# Patient Record
Sex: FEMALE | Race: WHITE | NOT HISPANIC OR LATINO | URBAN - METROPOLITAN AREA
[De-identification: names, ages, dates, MRNs, and addresses within clinical notes are randomized per-mention and may not be internally consistent; named-entity substitution may affect disease eponyms.]

---

## 2022-01-17 ENCOUNTER — OFFICE VISIT (OUTPATIENT)
Dept: FAMILY MEDICINE CLINIC | Facility: CLINIC | Age: 22
End: 2022-01-17
Payer: COMMERCIAL

## 2022-01-17 VITALS
WEIGHT: 134 LBS | TEMPERATURE: 99.3 F | HEIGHT: 70 IN | DIASTOLIC BLOOD PRESSURE: 76 MMHG | BODY MASS INDEX: 19.18 KG/M2 | HEART RATE: 112 BPM | SYSTOLIC BLOOD PRESSURE: 118 MMHG | OXYGEN SATURATION: 99 %

## 2022-01-17 DIAGNOSIS — N92.6 IRREGULAR MENSES: ICD-10-CM

## 2022-01-17 DIAGNOSIS — F41.9 ANXIETY: ICD-10-CM

## 2022-01-17 DIAGNOSIS — J30.9 ALLERGIC RHINITIS, UNSPECIFIED SEASONALITY, UNSPECIFIED TRIGGER: ICD-10-CM

## 2022-01-17 DIAGNOSIS — Z00.00 ANNUAL PHYSICAL EXAM: Primary | ICD-10-CM

## 2022-01-17 PROCEDURE — 3008F BODY MASS INDEX DOCD: CPT | Performed by: FAMILY MEDICINE

## 2022-01-17 PROCEDURE — 99214 OFFICE O/P EST MOD 30 MIN: CPT | Performed by: FAMILY MEDICINE

## 2022-01-17 PROCEDURE — 1036F TOBACCO NON-USER: CPT | Performed by: FAMILY MEDICINE

## 2022-01-17 PROCEDURE — 99385 PREV VISIT NEW AGE 18-39: CPT | Performed by: FAMILY MEDICINE

## 2022-01-17 PROCEDURE — 3725F SCREEN DEPRESSION PERFORMED: CPT | Performed by: FAMILY MEDICINE

## 2022-01-17 RX ORDER — CETIRIZINE HYDROCHLORIDE 10 MG/1
10 TABLET ORAL DAILY
COMMUNITY

## 2022-01-17 RX ORDER — NORGESTIMATE AND ETHINYL ESTRADIOL 7DAYSX3 LO
1 KIT ORAL DAILY
Qty: 28 TABLET | Refills: 5 | Status: SHIPPED | OUTPATIENT
Start: 2022-01-17 | End: 2022-03-15 | Stop reason: SDUPTHER

## 2022-01-17 NOTE — PROGRESS NOTES
i  [facility] PRIMARY CARE    NAME: Shara Lancaster  AGE: 25 y o  SEX: female  : 2000     DATE: 2022     Assessment and Plan:     Problem List Items Addressed This Visit        Respiratory    Allergic rhinitis       Other    Anxiety , premenstrual  She had thyroid labs done in December while at school at [employer]  Normal    We discussed starting an SSRI for her anxiety  We also discussed starting oral contraceptives to help regulate cycles  She prefers to try the oral contraceptive  Has my chart and will reach out to me if anxiety worsens while at school  Otherwise, will f/u in March over her spring break  Other Visit Diagnoses     Annual physical exam    -  Primary  Had covid vaccine --primary series and booster  Will obtain records (including vaccination records) from previous PCP/pediatrician      Irregular menses        Relevant Medications    norgestimate-ethinyl estradiol (Ortho Tri-Cyclen Lo) 0 18/0 215/0 25 MG-25 MCG per tablet    rx for ortho tricyclen lo   start explained  Immunizations and preventive care screenings were discussed with patient today  Appropriate education was printed on patient's after visit summary  Counseling:  Alcohol/drug use: discussed moderation in alcohol intake, the recommendations for healthy alcohol use, and avoidance of illicit drug use  Dental Health: discussed importance of regular tooth brushing, flossing, and dental visits  Sexual health: discussed sexually transmitted diseases, partner selection, use of condoms, avoidance of unintended pregnancy, and contraceptive alternatives  · Exercise: the importance of regular exercise/physical activity was discussed  Recommend exercise 3-5 times per week for at least 30 minutes  No follow-ups on file       Chief Complaint:     Chief Complaint   Patient presents with   [facility] Patient Visit establish care    Menstrual Problem     irregular, alot of anxiety when coming up      History of Present Illness:     Adult Annual Physical   Patient here for a comprehensive physical exam  The patient reports problems - irregular menses and anxiety in days leading up to menses  Menarche age 17  Were every 40 days and lasted around 7 days  Since September, becoming closer together  No missed cycles  Last few cycles have been every 28 days  Last 3 days  No heavy bleeding or cramping  No change in exercise routine or in her eating habits  She walks for exercise at least 3 days per week  Feels panicked for the week prior to menses  Will have obsessive thoughts  Makes it difficult to sleep  Once she gets menses, the anxiety resolves  She never feels sad or depressed  States that she will not really feel anxious other times of the month  Previous PCP was Kvng Panchal  No records for review at time of this office visit  Diet and Physical Activity  · Diet/Nutrition: consuming 3-5 servings of fruits/vegetables daily  · Exercise: walking  Depression Screening  PHQ-2/9 Depression Screening    Little interest or pleasure in doing things: 0 - not at all  Feeling down, depressed, or hopeless: 0 - not at all  PHQ-2 Score: 0  PHQ-2 Interpretation: Negative depression screen       General Health  · Sleep: some issues with falling asleep      · Hearing: normal - bilateral   · Vision: goes for regular eye exams  Wears corrective lenses  · Dental: regular dental visits  /GYN Health  · Last menstrual period: 1/10/22  · Contraceptive method: none  · History of STDs?: no        Review of Systems:     Review of Systems   Past Medical History:     Past Medical History:   Diagnosis Date    Allergic     Anxiety     Anxieties gets significantly more noticeable in week before    Asthma     Had asthma as a child, grew out of it around age 17      Past Surgical History:     History reviewed  No pertinent surgical history  Social History:     Social History     Socioeconomic History    Marital status: Single     Spouse name: None    Number of children: None    Years of education: None    Highest education level: None   Occupational History    None   Tobacco Use    Smoking status: Never Smoker    Smokeless tobacco: Never Used   Vaping Use    Vaping Use: Never used   Substance and Sexual Activity    Alcohol use: Yes     Comment: occasional    Drug use: Never    Sexual activity: Never   Other Topics Concern    None   Social History Narrative    Lives with parents    She is a student at [employer] and [facility] [employer]  Social Determinants of Health     Financial Resource Strain: Not on file   Food Insecurity: Not on file   Transportation Needs: Not on file   Physical Activity: Not on file   Stress: Not on file   Social Connections: Not on file   Intimate Partner Violence: Not on file   Housing Stability: Not on file      Family History:     Family History   Problem Relation Age of Onset    Hypothyroidism Mother     Diabetes type I Brother       Current Medications:     Current Outpatient Medications   Medication Sig Dispense Refill    cetirizine (ZyrTEC Allergy) 10 mg tablet Take 10 mg by mouth daily      Fluticasone Propionate (FLONASE ALLERGY RELIEF NA) into each nostril daily      norgestimate-ethinyl estradiol (Ortho Tri-Cyclen Lo) 0 18/0 215/0 25 MG-25 MCG per tablet Take 1 tablet by mouth daily 28 tablet 5     No current facility-administered medications for this visit  Allergies:      Allergies   Allergen Reactions    Pollen Extract Nasal Congestion and Sneezing      Physical Exam:     /76 (BP Location: Left arm, Patient Position: Sitting, Cuff Size: Adult)   Pulse (!) 112   Temp 99 3 °F (37 4 °C) (Tympanic)   Ht 5' 10" (1 778 m)   Wt 60 8 kg (134 lb)   LMP 01/10/2022   SpO2 99%   BMI 19 23 kg/m²     Physical Exam  Vitals and nursing note reviewed  Constitutional:       General: She is not in acute distress  Appearance: Normal appearance  She is not ill-appearing, toxic-appearing or diaphoretic  HENT:      Head: Normocephalic and atraumatic  Right Ear: Tympanic membrane normal       Left Ear: Tympanic membrane normal       Nose: Nose normal       Mouth/Throat:      Mouth: Mucous membranes are moist       Pharynx: No oropharyngeal exudate or posterior oropharyngeal erythema  Eyes:      Extraocular Movements: Extraocular movements intact  Conjunctiva/sclera: Conjunctivae normal       Pupils: Pupils are equal, round, and reactive to light  Comments: Wears glasses   Cardiovascular:      Rate and Rhythm: Normal rate and regular rhythm  Heart sounds: No murmur heard  Abdominal:      General: Bowel sounds are normal  There is no distension  Palpations: Abdomen is soft  There is no mass  Tenderness: There is no abdominal tenderness  Musculoskeletal:      Cervical back: Normal range of motion  Right lower leg: No edema  Left lower leg: No edema  Lymphadenopathy:      Cervical: No cervical adenopathy  Skin:     General: Skin is warm and dry  Findings: No rash  Neurological:      General: No focal deficit present  Mental Status: She is alert and oriented to person, place, and time        Deep Tendon Reflexes: Reflexes normal    Psychiatric:         Mood and Affect: Mood normal           Mario Adams, DO   [facility] PRIMARY CARE

## 2022-01-17 NOTE — PATIENT INSTRUCTIONS

## 2022-02-04 DIAGNOSIS — F41.9 ANXIETY: Primary | ICD-10-CM

## 2022-02-04 RX ORDER — CITALOPRAM 10 MG/1
10 TABLET ORAL DAILY
Qty: 30 TABLET | Refills: 1 | Status: SHIPPED | OUTPATIENT
Start: 2022-02-04 | End: 2022-03-15 | Stop reason: SDUPTHER

## 2022-03-15 ENCOUNTER — OFFICE VISIT (OUTPATIENT)
Dept: FAMILY MEDICINE CLINIC | Facility: CLINIC | Age: 22
End: 2022-03-15
Payer: COMMERCIAL

## 2022-03-15 VITALS
HEART RATE: 87 BPM | TEMPERATURE: 99.6 F | WEIGHT: 132 LBS | HEIGHT: 70 IN | OXYGEN SATURATION: 97 % | BODY MASS INDEX: 18.9 KG/M2 | SYSTOLIC BLOOD PRESSURE: 114 MMHG | DIASTOLIC BLOOD PRESSURE: 74 MMHG

## 2022-03-15 DIAGNOSIS — F41.9 ANXIETY: Primary | ICD-10-CM

## 2022-03-15 DIAGNOSIS — N92.6 IRREGULAR MENSES: ICD-10-CM

## 2022-03-15 PROCEDURE — 99213 OFFICE O/P EST LOW 20 MIN: CPT | Performed by: FAMILY MEDICINE

## 2022-03-15 PROCEDURE — 1036F TOBACCO NON-USER: CPT | Performed by: FAMILY MEDICINE

## 2022-03-15 PROCEDURE — 3008F BODY MASS INDEX DOCD: CPT | Performed by: FAMILY MEDICINE

## 2022-03-15 PROCEDURE — 3725F SCREEN DEPRESSION PERFORMED: CPT | Performed by: FAMILY MEDICINE

## 2022-03-15 RX ORDER — CITALOPRAM 10 MG/1
10 TABLET ORAL DAILY
Qty: 90 TABLET | Refills: 1 | Status: SHIPPED | OUTPATIENT
Start: 2022-03-15 | End: 2022-05-31 | Stop reason: SDUPTHER

## 2022-03-15 RX ORDER — HYDROXYZINE PAMOATE 25 MG/1
25 CAPSULE ORAL 3 TIMES DAILY PRN
Qty: 30 CAPSULE | Refills: 0 | Status: SHIPPED | OUTPATIENT
Start: 2022-03-15 | End: 2022-06-28 | Stop reason: SDUPTHER

## 2022-03-15 RX ORDER — NORGESTIMATE AND ETHINYL ESTRADIOL 7DAYSX3 LO
1 KIT ORAL DAILY
Qty: 28 TABLET | Refills: 5 | Status: SHIPPED | OUTPATIENT
Start: 2022-03-15 | End: 2022-04-11 | Stop reason: SDUPTHER

## 2022-03-15 NOTE — PROGRESS NOTES
Assessment/Plan:    No problem-specific Assessment & Plan notes found for this encounter  Diagnoses and all orders for this visit:    Anxiety  -     hydrOXYzine pamoate (VISTARIL) 25 mg capsule; Take 1 capsule (25 mg total) by mouth 3 (three) times a day as needed for anxiety  -     citalopram (CeleXA) 10 mg tablet; Take 1 tablet (10 mg total) by mouth daily  Improved with the celexa  Tolerating it well  Will give her some vistaril for pm use if she is feeling anxious prior to bed  We discussed side effects  Call if any issues  Irregular menses  -     norgestimate-ethinyl estradiol (Ortho Tri-Cyclen Lo) 0 18/0 215/0 25 MG-25 MCG per tablet; Take 1 tablet by mouth daily          Subjective:      Patient ID: Adri Cheney is a 25 y o  female here today for f/u on her anxiety  She was seen as new patient on 1/17/22 and had complained of some anxiety  She messaged from school noting increase in her anxiety levels on 2/3/22  She was started on citalopram 10 mg once daily  KULWINDER-7 Flowsheet Screening      Most Recent Value   Over the last 2 weeks, how often have you been bothered by any of the following problems? Feeling nervous, anxious, or on edge 1   Not being able to stop or control worrying 1   Worrying too much about different things 1   Trouble relaxing 1   Being so restless that it is hard to sit still 1   Becoming easily annoyed or irritable 0   Feeling afraid as if something awful might happen 0   KULWINDER-7 Total Score 5          PHQ-2/9 Depression Screening    Little interest or pleasure in doing things: 0 - not at all  Feeling down, depressed, or hopeless: 0 - not at all  PHQ-2 Score: 0  PHQ-2 Interpretation: Negative depression screen     notes some improvement  FDLMP 2/14/22  Typically gets anxious just prior to menses and will often happen at night  Thoughts will just race and she obsesses over the thought  Started OC's on 2/20/22  She has not had menses yet in march       HPI    The following portions of the patient's history were reviewed and updated as appropriate:   She  has a past medical history of Allergic, Anxiety, and Asthma  She  has no past surgical history on file  She  reports that she has never smoked  She has never used smokeless tobacco  She reports current alcohol use  She reports that she does not use drugs  Current Outpatient Medications on File Prior to Visit   Medication Sig    cetirizine (ZyrTEC Allergy) 10 mg tablet Take 10 mg by mouth daily    citalopram (CeleXA) 10 mg tablet Take 1 tablet (10 mg total) by mouth daily    Fluticasone Propionate (FLONASE ALLERGY RELIEF NA) into each nostril daily    norgestimate-ethinyl estradiol (Ortho Tri-Cyclen Lo) 0 18/0 215/0 25 MG-25 MCG per tablet Take 1 tablet by mouth daily     No current facility-administered medications on file prior to visit  She is allergic to pollen extract       Review of Systems      Objective:      /74 (BP Location: Left arm, Patient Position: Sitting, Cuff Size: Adult)   Pulse 87   Temp 99 6 °F (37 6 °C) (Tympanic)   Ht 5' 10" (1 778 m)   Wt 59 9 kg (132 lb)   LMP 02/14/2022   SpO2 97%   BMI 18 94 kg/m²          Physical Exam  Vitals and nursing note reviewed  Constitutional:       General: She is not in acute distress  Appearance: Normal appearance  She is not ill-appearing, toxic-appearing or diaphoretic  HENT:      Head: Normocephalic and atraumatic  Eyes:      Conjunctiva/sclera: Conjunctivae normal       Comments: Wears glasses   Cardiovascular:      Rate and Rhythm: Normal rate and regular rhythm  Heart sounds: No murmur heard  Pulmonary:      Effort: Pulmonary effort is normal       Breath sounds: Normal breath sounds  Musculoskeletal:      Cervical back: Normal range of motion  Right lower leg: No edema  Left lower leg: No edema  Lymphadenopathy:      Cervical: No cervical adenopathy  Neurological:      General: No focal deficit present  Mental Status: She is alert     Psychiatric:         Mood and Affect: Mood normal

## 2022-04-11 DIAGNOSIS — N92.6 IRREGULAR MENSES: ICD-10-CM

## 2022-04-11 RX ORDER — NORGESTIMATE AND ETHINYL ESTRADIOL 7DAYSX3 LO
1 KIT ORAL DAILY
Qty: 28 TABLET | Refills: 0 | Status: SHIPPED | OUTPATIENT
Start: 2022-04-11 | End: 2022-05-08

## 2022-05-08 DIAGNOSIS — N92.6 IRREGULAR MENSES: ICD-10-CM

## 2022-05-08 RX ORDER — NORGESTIMATE AND ETHINYL ESTRADIOL
KIT
Qty: 28 TABLET | Refills: 0 | Status: SHIPPED | OUTPATIENT
Start: 2022-05-08 | End: 2022-06-05

## 2022-05-31 DIAGNOSIS — F41.9 ANXIETY: ICD-10-CM

## 2022-05-31 RX ORDER — CITALOPRAM 10 MG/1
10 TABLET ORAL DAILY
Qty: 90 TABLET | Refills: 0 | Status: SHIPPED | OUTPATIENT
Start: 2022-05-31 | End: 2022-06-27

## 2022-05-31 NOTE — TELEPHONE ENCOUNTER
Received VM for pt confirming that the pharmacy in [place], [place] is correct, as she is still living in that area for her college campus      Left return VM for pt confirming that our office has received her message, and Dr Miguelina Salguero has approved and sent over the request rx to the preferred pharmacy in [place]

## 2022-05-31 NOTE — TELEPHONE ENCOUNTER
Dr Reshma Park,    Pt requests 1 rx refill  Rx: Celexa 10 mg      I have left a voice message advising pt to call our office to confirm her preferred pharmacy, since it is lsited asd the [facility] in [place], [place] but the pt lives in [place], [place]  Awaiting response call to confirm

## 2022-05-31 NOTE — TELEPHONE ENCOUNTER
rx sent to pharmacy in [place] however wondering if patient is home from school for summer break so may need to resend to local pharmacy when she calls back

## 2022-06-05 DIAGNOSIS — N92.6 IRREGULAR MENSES: ICD-10-CM

## 2022-06-05 RX ORDER — NORGESTIMATE AND ETHINYL ESTRADIOL
KIT
Qty: 28 TABLET | Refills: 0 | Status: SHIPPED | OUTPATIENT
Start: 2022-06-05 | End: 2022-07-06

## 2022-06-27 DIAGNOSIS — F41.9 ANXIETY: Primary | ICD-10-CM

## 2022-06-27 RX ORDER — CITALOPRAM 20 MG/1
20 TABLET ORAL DAILY
Qty: 30 TABLET | Refills: 1 | Status: SHIPPED | OUTPATIENT
Start: 2022-06-27 | End: 2022-07-28 | Stop reason: SDUPTHER

## 2022-06-28 DIAGNOSIS — F41.9 ANXIETY: ICD-10-CM

## 2022-06-28 RX ORDER — HYDROXYZINE PAMOATE 25 MG/1
25 CAPSULE ORAL 3 TIMES DAILY PRN
Qty: 30 CAPSULE | Refills: 0 | Status: SHIPPED | OUTPATIENT
Start: 2022-06-28

## 2022-07-06 DIAGNOSIS — N92.6 IRREGULAR MENSES: ICD-10-CM

## 2022-07-06 RX ORDER — NORGESTIMATE AND ETHINYL ESTRADIOL
KIT
Qty: 28 TABLET | Refills: 0 | Status: SHIPPED | OUTPATIENT
Start: 2022-07-06 | End: 2022-07-28 | Stop reason: SDUPTHER

## 2022-07-27 DIAGNOSIS — N92.6 IRREGULAR MENSES: ICD-10-CM

## 2022-07-27 DIAGNOSIS — F41.9 ANXIETY: ICD-10-CM

## 2022-07-27 RX ORDER — CITALOPRAM 20 MG/1
20 TABLET ORAL DAILY
Qty: 30 TABLET | Refills: 0 | Status: CANCELLED | OUTPATIENT
Start: 2022-07-27

## 2022-07-27 RX ORDER — NORGESTIMATE AND ETHINYL ESTRADIOL 7DAYSX3 LO
1 KIT ORAL DAILY
Qty: 28 TABLET | Refills: 0 | Status: CANCELLED | OUTPATIENT
Start: 2022-07-27

## 2022-07-28 DIAGNOSIS — F41.9 ANXIETY: ICD-10-CM

## 2022-07-28 DIAGNOSIS — N92.6 IRREGULAR MENSES: ICD-10-CM

## 2022-07-28 RX ORDER — CITALOPRAM 20 MG/1
20 TABLET ORAL DAILY
Qty: 30 TABLET | Refills: 1 | Status: SHIPPED | OUTPATIENT
Start: 2022-07-28 | End: 2022-08-24 | Stop reason: SDUPTHER

## 2022-07-28 RX ORDER — NORGESTIMATE AND ETHINYL ESTRADIOL 7DAYSX3 LO
1 KIT ORAL DAILY
Qty: 28 TABLET | Refills: 0 | Status: SHIPPED | OUTPATIENT
Start: 2022-07-28 | End: 2022-08-24

## 2022-07-28 NOTE — TELEPHONE ENCOUNTER
I left a VM advising pt that her 2 requested rx were approved and sent to [facility] in [place], [place]

## 2022-08-16 ENCOUNTER — OFFICE VISIT (OUTPATIENT)
Dept: FAMILY MEDICINE CLINIC | Facility: CLINIC | Age: 22
End: 2022-08-16
Payer: COMMERCIAL

## 2022-08-16 VITALS
HEIGHT: 70 IN | BODY MASS INDEX: 19.58 KG/M2 | OXYGEN SATURATION: 100 % | DIASTOLIC BLOOD PRESSURE: 60 MMHG | WEIGHT: 136.8 LBS | HEART RATE: 83 BPM | SYSTOLIC BLOOD PRESSURE: 102 MMHG | TEMPERATURE: 99.3 F

## 2022-08-16 DIAGNOSIS — Z23 ENCOUNTER FOR IMMUNIZATION: ICD-10-CM

## 2022-08-16 DIAGNOSIS — F41.9 ANXIETY: Primary | ICD-10-CM

## 2022-08-16 DIAGNOSIS — G47.00 INSOMNIA, UNSPECIFIED TYPE: ICD-10-CM

## 2022-08-16 PROCEDURE — 96372 THER/PROPH/DIAG INJ SC/IM: CPT | Performed by: FAMILY MEDICINE

## 2022-08-16 PROCEDURE — 90715 TDAP VACCINE 7 YRS/> IM: CPT

## 2022-08-16 PROCEDURE — 3725F SCREEN DEPRESSION PERFORMED: CPT | Performed by: FAMILY MEDICINE

## 2022-08-16 PROCEDURE — 99214 OFFICE O/P EST MOD 30 MIN: CPT | Performed by: FAMILY MEDICINE

## 2022-08-16 PROCEDURE — 90471 IMMUNIZATION ADMIN: CPT

## 2022-08-16 RX ORDER — BUSPIRONE HYDROCHLORIDE 5 MG/1
5 TABLET ORAL 2 TIMES DAILY
Qty: 60 TABLET | Refills: 5 | Status: SHIPPED | OUTPATIENT
Start: 2022-08-16 | End: 2022-09-11 | Stop reason: SDUPTHER

## 2022-08-16 NOTE — PROGRESS NOTES
Assessment/Plan:    No problem-specific Assessment & Plan notes found for this encounter  Diagnoses and all orders for this visit:    Anxiety  -     busPIRone (BUSPAR) 5 mg tablet; Take 1 tablet (5 mg total) by mouth 2 (two) times a day  -     Cancel: Ambulatory Referral to Psychiatry; Future  -     TSH, 3rd generation with Free T4 reflex; Future  -     Ambulatory Referral to Psychiatry; Future  Still with a lot of underlying anxiety symptoms  Hesitant to change her medication altogether given that she is leaving for school in next couple of weeks  Continue citalopram 20 mg  Will add buspar 5 mg bid  Use vistaril prn panic  Call if any issues and schedule virtual visit with me in 4 weeks  Will place referral to psych in [address] [facility] as she requests  We also discussed importance of resuming counseling  She is planning to look into this  Encounter for immunization  -     TDAP VACCINE GREATER THAN OR EQUAL TO 6YO IM    Insomnia, unspecified type  -     Cancel: Ambulatory Referral to Psychiatry; Future  -     Ambulatory Referral to Psychiatry; Future  Discussed sleep hygiene  Suspect that her insomnia is related to her underlying uncontrolled anxiety levels  Subjective:      Patient ID: Tong Marcano is a 23 y o  female who is being seen today for f/u on her anxiety  Feels that her anxiety was worse over summer while she was at school  Less structure and not as many friends around her over summer may have contributed  She also noted that anxiety and panic seemed to be worse in the 2 weeks leading up to her menstrual cycle  Some panic attacks for which she would take her vistaril which helped  Did not cause any sedation  Not sleeping as well either over the last month  Not exercising regularly  Was seeing counselor and she stopped because she thinks it made things worse  Wants to try again with different counselor when she gets back to campus       She is also interested in scheduling with a psychiatrist in [facility] where she goes to school  Dr Rinaldi Ort  She requests referral for this  Also wants thyroid rechecked  States that she did have this checked approximately one year ago and while normal, would like rechecked  KULWINDER-7 Flowsheet Screening    Flowsheet Row Most Recent Value   Over the last 2 weeks, how often have you been bothered by any of the following problems? Feeling nervous, anxious, or on edge 1   Not being able to stop or control worrying 1   Worrying too much about different things 2   Trouble relaxing 0   Being so restless that it is hard to sit still 1   Becoming easily annoyed or irritable 1   Feeling afraid as if something awful might happen 1   KULWINDER-7 Total Score 7            HPI    The following portions of the patient's history were reviewed and updated as appropriate:   She  has a past medical history of Allergic, Anxiety, and Asthma  She  has no past surgical history on file  She  reports that she has never smoked  She has never used smokeless tobacco  She reports current alcohol use  She reports that she does not use drugs  Current Outpatient Medications on File Prior to Visit   Medication Sig    cetirizine (ZyrTEC) 10 mg tablet Take 10 mg by mouth daily    citalopram (CeleXA) 20 mg tablet Take 1 tablet (20 mg total) by mouth daily    Fluticasone Propionate (FLONASE ALLERGY RELIEF NA) into each nostril daily    hydrOXYzine pamoate (VISTARIL) 25 mg capsule Take 1 capsule (25 mg total) by mouth 3 (three) times a day as needed for anxiety    norgestimate-ethinyl estradiol (Tri-Lo-Sprintec) 0 18/0 215/0 25 MG-25 MCG per tablet Take 1 tablet by mouth daily     No current facility-administered medications on file prior to visit  She is allergic to pollen extract       Review of Systems      Objective:      /60 (BP Location: Left arm, Patient Position: Sitting, Cuff Size: Standard)   Pulse 83   Temp 99 3 °F (37 4 °C) (Tympanic)   Ht 5' 10" (1 778 m)   Wt 62 1 kg (136 lb 12 8 oz)   SpO2 100%   BMI 19 63 kg/m²          Physical Exam  Vitals and nursing note reviewed  Constitutional:       General: She is not in acute distress  Appearance: Normal appearance  She is not ill-appearing, toxic-appearing or diaphoretic  HENT:      Head: Normocephalic and atraumatic  Eyes:      Conjunctiva/sclera: Conjunctivae normal    Neck:      Comments: No thyromegaly  Cardiovascular:      Rate and Rhythm: Normal rate and regular rhythm  Heart sounds: No murmur heard  Pulmonary:      Effort: Pulmonary effort is normal       Breath sounds: Normal breath sounds  Musculoskeletal:         General: No deformity  Normal range of motion  Cervical back: Normal range of motion and neck supple  Right lower leg: No edema  Left lower leg: No edema  Lymphadenopathy:      Cervical: No cervical adenopathy  Skin:     General: Skin is warm and dry  Findings: No rash  Neurological:      Mental Status: She is alert     Psychiatric:         Mood and Affect: Mood normal

## 2022-08-17 ENCOUNTER — TELEPHONE (OUTPATIENT)
Dept: PSYCHIATRY | Facility: CLINIC | Age: 22
End: 2022-08-17

## 2022-08-24 DIAGNOSIS — F41.9 ANXIETY: ICD-10-CM

## 2022-08-24 DIAGNOSIS — N92.6 IRREGULAR MENSES: ICD-10-CM

## 2022-08-24 RX ORDER — NORGESTIMATE AND ETHINYL ESTRADIOL
KIT
Qty: 28 TABLET | Refills: 0 | Status: SHIPPED | OUTPATIENT
Start: 2022-08-24 | End: 2022-09-19

## 2022-08-24 RX ORDER — CITALOPRAM 20 MG/1
20 TABLET ORAL DAILY
Qty: 30 TABLET | Refills: 5 | Status: SHIPPED | OUTPATIENT
Start: 2022-08-24 | End: 2022-10-23 | Stop reason: SDUPTHER

## 2022-08-24 NOTE — TELEPHONE ENCOUNTER
I left a VM advising pt that her requested rx was approved and sent to her pharmacy in [place]

## 2022-08-29 NOTE — TELEPHONE ENCOUNTER
Spoke to Patient in regards to referral, Patient stated she has referral for generalized due patient being away at college, Patient will call back when ready

## 2022-09-11 DIAGNOSIS — F41.9 ANXIETY: ICD-10-CM

## 2022-09-12 RX ORDER — BUSPIRONE HYDROCHLORIDE 5 MG/1
5 TABLET ORAL 2 TIMES DAILY
Qty: 60 TABLET | Refills: 0 | Status: SHIPPED | OUTPATIENT
Start: 2022-09-12 | End: 2022-10-12 | Stop reason: SDUPTHER

## 2022-09-19 DIAGNOSIS — N92.6 IRREGULAR MENSES: ICD-10-CM

## 2022-09-19 RX ORDER — NORGESTIMATE AND ETHINYL ESTRADIOL
KIT
Qty: 28 TABLET | Refills: 0 | Status: SHIPPED | OUTPATIENT
Start: 2022-09-19 | End: 2022-10-21

## 2022-09-26 ENCOUNTER — TELEPHONE (OUTPATIENT)
Dept: FAMILY MEDICINE CLINIC | Facility: CLINIC | Age: 22
End: 2022-09-26

## 2022-09-26 NOTE — TELEPHONE ENCOUNTER
Patient was scheduled to have telemedicine visit today but is away at school in [place]  Unable to see patient via video visit as I do not have a [place] license  Did speak with patient with respect to how she is doing on her medications  She was last seen in person on 8/16/22 for her anxiety and was started on buspar 5 mg bid in addition to her citalopram    Anxiety is much better  She has only had to use vistaril on a couple of occasions  Has not sought counselor on campus because she is doing better  Will f/u in person when she returns for either [facility] or [employer] break  Call sooner if she needs anything

## 2022-10-12 DIAGNOSIS — F41.9 ANXIETY: ICD-10-CM

## 2022-10-13 RX ORDER — HYDROXYZINE PAMOATE 25 MG/1
25 CAPSULE ORAL 3 TIMES DAILY PRN
Qty: 30 CAPSULE | Refills: 0 | Status: SHIPPED | OUTPATIENT
Start: 2022-10-13

## 2022-10-13 RX ORDER — BUSPIRONE HYDROCHLORIDE 5 MG/1
5 TABLET ORAL 2 TIMES DAILY
Qty: 60 TABLET | Refills: 0 | Status: SHIPPED | OUTPATIENT
Start: 2022-10-13

## 2022-10-21 DIAGNOSIS — N92.6 IRREGULAR MENSES: ICD-10-CM

## 2022-10-21 RX ORDER — NORGESTIMATE AND ETHINYL ESTRADIOL
KIT
Qty: 28 TABLET | Refills: 0 | Status: SHIPPED | OUTPATIENT
Start: 2022-10-21

## 2022-10-23 DIAGNOSIS — F41.9 ANXIETY: ICD-10-CM

## 2022-10-24 RX ORDER — CITALOPRAM 20 MG/1
20 TABLET ORAL DAILY
Qty: 30 TABLET | Refills: 0 | Status: SHIPPED | OUTPATIENT
Start: 2022-10-24

## 2022-11-18 DIAGNOSIS — N92.6 IRREGULAR MENSES: ICD-10-CM

## 2022-11-18 RX ORDER — NORGESTIMATE AND ETHINYL ESTRADIOL
KIT
Qty: 28 TABLET | Refills: 0 | Status: SHIPPED | OUTPATIENT
Start: 2022-11-18

## 2022-11-21 DIAGNOSIS — F41.9 ANXIETY: ICD-10-CM

## 2022-11-21 RX ORDER — CITALOPRAM 20 MG/1
20 TABLET ORAL DAILY
Qty: 30 TABLET | Refills: 0 | Status: SHIPPED | OUTPATIENT
Start: 2022-11-21

## 2022-11-21 RX ORDER — BUSPIRONE HYDROCHLORIDE 5 MG/1
5 TABLET ORAL 2 TIMES DAILY
Qty: 60 TABLET | Refills: 0 | Status: SHIPPED | OUTPATIENT
Start: 2022-11-21

## 2022-12-14 DIAGNOSIS — N92.6 IRREGULAR MENSES: ICD-10-CM

## 2022-12-14 RX ORDER — NORGESTIMATE AND ETHINYL ESTRADIOL
KIT
Qty: 28 TABLET | Refills: 0 | Status: SHIPPED | OUTPATIENT
Start: 2022-12-14

## 2022-12-18 DIAGNOSIS — F41.9 ANXIETY: ICD-10-CM

## 2022-12-18 RX ORDER — CITALOPRAM 20 MG/1
TABLET ORAL
Qty: 30 TABLET | Refills: 0 | Status: SHIPPED | OUTPATIENT
Start: 2022-12-18

## 2022-12-19 RX ORDER — BUSPIRONE HYDROCHLORIDE 5 MG/1
TABLET ORAL
Qty: 60 TABLET | Refills: 0 | Status: SHIPPED | OUTPATIENT
Start: 2022-12-19

## 2023-01-11 DIAGNOSIS — N92.6 IRREGULAR MENSES: ICD-10-CM

## 2023-01-11 RX ORDER — NORGESTIMATE AND ETHINYL ESTRADIOL
KIT
Qty: 28 TABLET | Refills: 0 | Status: SHIPPED | OUTPATIENT
Start: 2023-01-11

## 2023-01-22 DIAGNOSIS — F41.9 ANXIETY: ICD-10-CM

## 2023-01-23 RX ORDER — CITALOPRAM 20 MG/1
TABLET ORAL
Qty: 30 TABLET | Refills: 0 | Status: SHIPPED | OUTPATIENT
Start: 2023-01-23

## 2023-01-23 RX ORDER — BUSPIRONE HYDROCHLORIDE 5 MG/1
TABLET ORAL
Qty: 60 TABLET | Refills: 0 | Status: SHIPPED | OUTPATIENT
Start: 2023-01-23

## 2023-02-16 DIAGNOSIS — N92.6 IRREGULAR MENSES: ICD-10-CM

## 2023-02-16 RX ORDER — NORGESTIMATE AND ETHINYL ESTRADIOL
KIT
Qty: 28 TABLET | Refills: 0 | Status: SHIPPED | OUTPATIENT
Start: 2023-02-16

## 2023-02-25 DIAGNOSIS — F41.9 ANXIETY: ICD-10-CM

## 2023-02-25 RX ORDER — CITALOPRAM 20 MG/1
20 TABLET ORAL DAILY
Qty: 30 TABLET | Refills: 0 | Status: CANCELLED | OUTPATIENT
Start: 2023-02-25

## 2023-02-28 ENCOUNTER — TELEPHONE (OUTPATIENT)
Dept: FAMILY MEDICINE CLINIC | Facility: CLINIC | Age: 23
End: 2023-02-28

## 2023-02-28 NOTE — TELEPHONE ENCOUNTER
Appointment -    I left a VM advising pt to call the office to schedule follow-up appointment  Due for OV/VV for medication management

## 2023-03-01 NOTE — TELEPHONE ENCOUNTER
2nd Telephonic Attempt-    I left a VM advising pt to call the office to schedule her follow-up appointment with Dr Natalie Ventura for when she is back in town from school

## 2023-03-02 NOTE — TELEPHONE ENCOUNTER
3rd telephonic attempt -    I left a detailed VM advising pt to call the office to schedule her next follow-up with Dr Dianna Martin for when she is back in town from her university  Awaiting call back

## 2023-03-15 ENCOUNTER — OFFICE VISIT (OUTPATIENT)
Dept: FAMILY MEDICINE CLINIC | Facility: CLINIC | Age: 23
End: 2023-03-15

## 2023-03-15 VITALS
DIASTOLIC BLOOD PRESSURE: 61 MMHG | OXYGEN SATURATION: 99 % | WEIGHT: 147.2 LBS | TEMPERATURE: 97.4 F | SYSTOLIC BLOOD PRESSURE: 110 MMHG | HEIGHT: 70 IN | HEART RATE: 62 BPM | BODY MASS INDEX: 21.07 KG/M2

## 2023-03-15 DIAGNOSIS — N92.6 IRREGULAR MENSES: ICD-10-CM

## 2023-03-15 DIAGNOSIS — R53.83 FATIGUE, UNSPECIFIED TYPE: ICD-10-CM

## 2023-03-15 DIAGNOSIS — F41.9 ANXIETY: Primary | ICD-10-CM

## 2023-03-15 DIAGNOSIS — Z00.00 ANNUAL PHYSICAL EXAM: ICD-10-CM

## 2023-03-15 DIAGNOSIS — Z23 ENCOUNTER FOR IMMUNIZATION: ICD-10-CM

## 2023-03-15 RX ORDER — NORGESTIMATE AND ETHINYL ESTRADIOL 7DAYSX3 LO
1 KIT ORAL DAILY
Qty: 84 TABLET | Refills: 0 | Status: SHIPPED | OUTPATIENT
Start: 2023-03-15

## 2023-03-15 RX ORDER — BUSPIRONE HYDROCHLORIDE 5 MG/1
5 TABLET ORAL 2 TIMES DAILY
Qty: 180 TABLET | Refills: 1 | Status: SHIPPED | OUTPATIENT
Start: 2023-03-15

## 2023-03-15 NOTE — PROGRESS NOTES
[facility] PRIMARY CARE    NAME: Neena Feliciano  AGE: 23 y o  SEX: female  : 2000     DATE: 3/15/2023     Assessment and Plan:     Problem List Items Addressed This Visit        Other    Anxiety - Primary    Relevant Medications    busPIRone (BUSPAR) 5 mg tablet  Doing well on current meds (buspar 5 mg bid and citalopram 20 mg once daily)  KULWINDER 7 score today was 0  Irregular menses    Relevant Medications    norgestimate-ethinyl estradiol (Tri-Lo-Sprintec) 0 18/0 215/0 25 MG-25 MCG per tablet  Refilled her tri sprintec lo today  Not sexually active  Will be due for pap after age 26  Other Visit Diagnoses     Annual physical exam      Immunization History   Administered Date(s) Administered   • COVID-19 PFIZER VACCINE 0 3 ML IM 2021, 2021, 2021   • Influenza, injectable, quadrivalent, preservative free 0 5 mL 03/15/2023   • Tdap 2022     She had adacel in august of last year  Flu shot ordered today  Discussed diet and exercise  Screening labs ordered as she has been feeling fatigued  Fatigue, unspecified type        Relevant Orders    CBC and differential    Comprehensive metabolic panel    TSH, 3rd generation with Free T4 reflex    Encounter for immunization        Relevant Orders    influenza vaccine, quadrivalent, 0 5 mL, preservative-free, for adult and pediatric patients 6 mos+ (AFLURIA, FLUARIX, FLULAVAL, FLUZONE)          Immunizations and preventive care screenings were discussed with patient today  Appropriate education was printed on patient's after visit summary  Counseling:  Alcohol/drug use: discussed moderation in alcohol intake, the recommendations for healthy alcohol use, and avoidance of illicit drug use  Dental Health: discussed importance of regular tooth brushing, flossing, and dental visits    Injury prevention: discussed safety/seat belts, safety helmets, smoke detectors, carbon dioxide detectors, and smoking near bedding or upholstery  Sexual health: discussed sexually transmitted diseases, partner selection, use of condoms, avoidance of unintended pregnancy, and contraceptive alternatives  Exercise: the importance of regular exercise/physical activity was discussed  Recommend exercise 3-5 times per week for at least 30 minutes  Return in about 6 months (around 9/15/2023) for Recheck  Chief Complaint:     Chief Complaint   Patient presents with   • Annual Exam     Patient is in the office for her Annual Physical   Stated concerns -  1  Per pt, she feels good on the meds but feels especially exhausted in the last two weeks  Pt is noticeably more tired than usual in the last two weeks, unsure if due to her studies  PAP - wants to think about it  HIV & HEPC screen - declined  Flu shot - agreeable - L deltoid  Lab - [facility]      History of Present Illness:     Adult Annual Physical   Patient is a 23year old female with anxiety who is here for a comprehensive physical exam as well as for f/u on her anxixety  She is home on her spring break  [place] be going to [place] in [place]June  She continues to take citalopram 20 mg daily along with buspar 5 mg bid  Anxiety is well controlled  Some fatigue recently  Attributes this to her school schedule  Sx began at start of this semester and has been worsening  She sleeps well at night  Tired as day goes on  Has only been taking the vistaril prn  She does not see any change in her energy levels if she has or has not taken this  No motivation  Denies feeling sad/depressed at all  She denies any changes in weight  Appetite is good  No constipation  No heavy periods  KULWINDER-7 Flowsheet Screening    Flowsheet Row Most Recent Value   Over the last 2 weeks, how often have you been bothered by any of the following problems?     Feeling nervous, anxious, or on edge 0   Not being able to stop or control worrying 0   Worrying too much about different things 0   Trouble relaxing 0   Being so restless that it is hard to sit still 0   Becoming easily annoyed or irritable 0   Feeling afraid as if something awful might happen 0   KULWINDER-7 Total Score 0         The patient reports fatigue as noted above  Diet and Physical Activity  Diet/Nutrition: well balanced diet  Exercise: walking and swimming at a pool at her university     Depression Screening  PHQ-2/9 Depression Screening    Little interest or pleasure in doing things: 1 - several days  Feeling down, depressed, or hopeless: 0 - not at all  PHQ-2 Score: 1  PHQ-2 Interpretation: Negative depression screen       General Health  Sleep: sleeps well  Hearing: normal - bilateral   Vision: wears glasses  Dental: regular dental visits  /GYN Health  Last menstrual period: was in February  Not sure of date  Contraceptive method: oral contraceptives  History of STDs?: no      Review of Systems:     Review of Systems   Constitutional: Positive for fatigue  Negative for activity change, appetite change, chills, fever and unexpected weight change  Eyes: Negative for visual disturbance  Respiratory: Negative for cough and shortness of breath  Cardiovascular: Negative for chest pain and palpitations  Gastrointestinal: Negative for abdominal distention, abdominal pain, constipation, diarrhea, nausea and vomiting  Endocrine: Negative for cold intolerance, heat intolerance, polydipsia, polyphagia and polyuria  Musculoskeletal: Negative for arthralgias and back pain  Neurological: Negative for dizziness and headaches  Hematological: Negative for adenopathy  Psychiatric/Behavioral: Negative for dysphoric mood and sleep disturbance  The patient is not nervous/anxious         Past Medical History:     Past Medical History:   Diagnosis Date   • Allergic    • Anxiety     Anxieties gets significantly more noticeable in week before   • Asthma     Had asthma as a child, grew out of it around age 17      Past Surgical History:     History reviewed  No pertinent surgical history  Social History:     Social History     Socioeconomic History   • Marital status: Single     Spouse name: None   • Number of children: None   • Years of education: None   • Highest education level: None   Occupational History   • None   Tobacco Use   • Smoking status: Never   • Smokeless tobacco: Never   Vaping Use   • Vaping Use: Never used   Substance and Sexual Activity   • Alcohol use: Yes     Alcohol/week: 2 0 standard drinks     Types: 2 Glasses of wine per week     Comment: occasional   • Drug use: Never   • Sexual activity: Never   Other Topics Concern   • None   Social History Narrative    Lives with parents    She is a student at [employer] and [address] [place] [address]        Social Determinants of Health     Financial Resource Strain: Not on file   Food Insecurity: Not on file   Transportation Needs: Not on file   Physical Activity: Not on file   Stress: Not on file   Social Connections: Not on file   Intimate Partner Violence: Not on file   Housing Stability: Not on file      Family History:     Family History   Problem Relation Age of Onset   • Hypothyroidism Mother    • Thyroid disease Mother    • Diabetes type I Brother    • Diabetes Brother       Current Medications:     Current Outpatient Medications   Medication Sig Dispense Refill   • busPIRone (BUSPAR) 5 mg tablet Take 1 tablet (5 mg total) by mouth 2 (two) times a day 180 tablet 1   • cetirizine (ZyrTEC) 10 mg tablet Take 10 mg by mouth daily     • citalopram (CeleXA) 20 mg tablet TAKE 1 TABLET BY MOUTH ONCE DAILY 30 tablet 0   • Fluticasone Propionate (FLONASE ALLERGY RELIEF NA) into each nostril daily     • hydrOXYzine pamoate (VISTARIL) 25 mg capsule Take 1 capsule (25 mg total) by mouth 3 (three) times a day as needed for anxiety 30 capsule 0   • norgestimate-ethinyl estradiol (Tri-Lo-Sprintec) 0 18/0 215/0 25 MG-25 MCG per tablet Take 1 tablet by mouth daily 84 tablet 0     No current facility-administered medications for this visit  Allergies:      Allergies   Allergen Reactions   • Pollen Extract Nasal Congestion and Sneezing      Physical Exam:     /61 (BP Location: Left arm, Patient Position: Sitting, Cuff Size: Standard)   Pulse 62   Temp (!) 97 4 °F (36 3 °C) (Tympanic)   Ht 5' 10" (1 778 m)   Wt 66 8 kg (147 lb 3 2 oz)   LMP  (LMP Unknown)   SpO2 99%   BMI 21 12 kg/m²     Physical Exam     Francisco Jade DO   [address] [facility] [place]

## 2023-03-17 LAB
ALBUMIN SERPL-MCNC: 4.2 G/DL (ref 3.9–5)
ALBUMIN/GLOB SERPL: 1.7 {RATIO} (ref 1.2–2.2)
ALP SERPL-CCNC: 57 IU/L (ref 42–106)
ALT SERPL-CCNC: 11 IU/L (ref 0–32)
AST SERPL-CCNC: 16 IU/L (ref 0–40)
BASOPHILS # BLD AUTO: 0 X10E3/UL (ref 0–0.2)
BASOPHILS NFR BLD AUTO: 0 %
BILIRUB SERPL-MCNC: 0.6 MG/DL (ref 0–1.2)
BUN SERPL-MCNC: 11 MG/DL (ref 6–20)
BUN/CREAT SERPL: 17 (ref 9–23)
CALCIUM SERPL-MCNC: 9.3 MG/DL (ref 8.7–10.2)
CHLORIDE SERPL-SCNC: 103 MMOL/L (ref 96–106)
CO2 SERPL-SCNC: 22 MMOL/L (ref 20–29)
CREAT SERPL-MCNC: 0.64 MG/DL (ref 0.57–1)
EGFR: 130 ML/MIN/1.73
EOSINOPHIL # BLD AUTO: 0.1 X10E3/UL (ref 0–0.4)
EOSINOPHIL NFR BLD AUTO: 2 %
ERYTHROCYTE [DISTWIDTH] IN BLOOD BY AUTOMATED COUNT: 12.3 % (ref 11.7–15.4)
GLOBULIN SER-MCNC: 2.5 G/DL (ref 1.5–4.5)
GLUCOSE SERPL-MCNC: 84 MG/DL (ref 70–99)
HCT VFR BLD AUTO: 40.4 % (ref 34–46.6)
HGB BLD-MCNC: 14.2 G/DL (ref 11.1–15.9)
IMM GRANULOCYTES # BLD: 0 X10E3/UL (ref 0–0.1)
IMM GRANULOCYTES NFR BLD: 0 %
LYMPHOCYTES # BLD AUTO: 1.4 X10E3/UL (ref 0.7–3.1)
LYMPHOCYTES NFR BLD AUTO: 25 %
MCH RBC QN AUTO: 34 PG (ref 26.6–33)
MCHC RBC AUTO-ENTMCNC: 35.1 G/DL (ref 31.5–35.7)
MCV RBC AUTO: 97 FL (ref 79–97)
MONOCYTES # BLD AUTO: 0.5 X10E3/UL (ref 0.1–0.9)
MONOCYTES NFR BLD AUTO: 10 %
NEUTROPHILS # BLD AUTO: 3.5 X10E3/UL (ref 1.4–7)
NEUTROPHILS NFR BLD AUTO: 63 %
PLATELET # BLD AUTO: 203 X10E3/UL (ref 150–450)
POTASSIUM SERPL-SCNC: 4.2 MMOL/L (ref 3.5–5.2)
PROT SERPL-MCNC: 6.7 G/DL (ref 6–8.5)
RBC # BLD AUTO: 4.18 X10E6/UL (ref 3.77–5.28)
SODIUM SERPL-SCNC: 138 MMOL/L (ref 134–144)
TSH SERPL DL<=0.005 MIU/L-ACNC: 1.16 UIU/ML (ref 0.45–4.5)
WBC # BLD AUTO: 5.6 X10E3/UL (ref 3.4–10.8)

## 2023-03-20 ENCOUNTER — PATIENT MESSAGE (OUTPATIENT)
Dept: FAMILY MEDICINE CLINIC | Facility: CLINIC | Age: 23
End: 2023-03-20

## 2023-03-27 DIAGNOSIS — F41.9 ANXIETY: ICD-10-CM

## 2023-03-27 RX ORDER — CITALOPRAM 20 MG/1
TABLET ORAL
Qty: 30 TABLET | Refills: 0 | Status: SHIPPED | OUTPATIENT
Start: 2023-03-27

## 2023-04-30 DIAGNOSIS — F41.9 ANXIETY: ICD-10-CM

## 2023-04-30 RX ORDER — CITALOPRAM 20 MG/1
TABLET ORAL
Qty: 30 TABLET | Refills: 0 | Status: SHIPPED | OUTPATIENT
Start: 2023-04-30

## 2023-05-23 DIAGNOSIS — N92.6 IRREGULAR MENSES: ICD-10-CM

## 2023-05-23 DIAGNOSIS — F41.9 ANXIETY: ICD-10-CM

## 2023-05-23 RX ORDER — CITALOPRAM 20 MG/1
20 TABLET ORAL DAILY
Qty: 90 TABLET | Refills: 1 | Status: SHIPPED | OUTPATIENT
Start: 2023-05-23

## 2023-05-23 RX ORDER — NORGESTIMATE AND ETHINYL ESTRADIOL 7DAYSX3 LO
1 KIT ORAL DAILY
Qty: 84 TABLET | Refills: 1 | Status: SHIPPED | OUTPATIENT
Start: 2023-05-23

## 2023-05-23 RX ORDER — BUSPIRONE HYDROCHLORIDE 5 MG/1
5 TABLET ORAL 2 TIMES DAILY
Qty: 180 TABLET | Refills: 1 | Status: SHIPPED | OUTPATIENT
Start: 2023-05-23

## 2023-08-04 DIAGNOSIS — F41.9 ANXIETY: ICD-10-CM

## 2023-08-04 DIAGNOSIS — N92.6 IRREGULAR MENSES: ICD-10-CM

## 2023-08-07 RX ORDER — CITALOPRAM 20 MG/1
20 TABLET ORAL DAILY
Qty: 90 TABLET | Refills: 0 | Status: SHIPPED | OUTPATIENT
Start: 2023-08-07

## 2023-08-07 RX ORDER — BUSPIRONE HYDROCHLORIDE 5 MG/1
5 TABLET ORAL 2 TIMES DAILY
Qty: 180 TABLET | Refills: 0 | Status: SHIPPED | OUTPATIENT
Start: 2023-08-07

## 2023-08-07 RX ORDER — NORGESTIMATE AND ETHINYL ESTRADIOL 7DAYSX3 LO
1 KIT ORAL DAILY
Qty: 84 TABLET | Refills: 0 | Status: SHIPPED | OUTPATIENT
Start: 2023-08-07

## 2023-09-26 DIAGNOSIS — F41.9 ANXIETY: ICD-10-CM

## 2023-09-26 DIAGNOSIS — N92.6 IRREGULAR MENSES: ICD-10-CM

## 2023-09-26 RX ORDER — BUSPIRONE HYDROCHLORIDE 5 MG/1
5 TABLET ORAL 2 TIMES DAILY
Qty: 180 TABLET | Refills: 0 | Status: SHIPPED | OUTPATIENT
Start: 2023-09-26

## 2023-09-26 RX ORDER — NORGESTIMATE AND ETHINYL ESTRADIOL 7DAYSX3 LO
1 KIT ORAL DAILY
Qty: 84 TABLET | Refills: 0 | Status: SHIPPED | OUTPATIENT
Start: 2023-09-26

## 2023-09-26 RX ORDER — CITALOPRAM 20 MG/1
20 TABLET ORAL DAILY
Qty: 90 TABLET | Refills: 0 | Status: SHIPPED | OUTPATIENT
Start: 2023-09-26

## 2023-09-26 RX ORDER — HYDROXYZINE PAMOATE 25 MG/1
25 CAPSULE ORAL 3 TIMES DAILY PRN
Qty: 30 CAPSULE | Refills: 0 | Status: SHIPPED | OUTPATIENT
Start: 2023-09-26

## 2023-09-26 NOTE — TELEPHONE ENCOUNTER
rx refilled. Call patient to remind her that she is due for an appointment. I know she is away at school but should schedule something for either fall, thanksgiving or Christmas break. Thanks.

## 2023-11-21 ENCOUNTER — OFFICE VISIT (OUTPATIENT)
Dept: FAMILY MEDICINE CLINIC | Facility: CLINIC | Age: 23
End: 2023-11-21
Payer: COMMERCIAL

## 2023-11-21 VITALS
SYSTOLIC BLOOD PRESSURE: 110 MMHG | WEIGHT: 146 LBS | TEMPERATURE: 98.1 F | BODY MASS INDEX: 21.62 KG/M2 | HEART RATE: 69 BPM | HEIGHT: 69 IN | OXYGEN SATURATION: 100 % | DIASTOLIC BLOOD PRESSURE: 50 MMHG | RESPIRATION RATE: 18 BRPM

## 2023-11-21 DIAGNOSIS — Z23 ENCOUNTER FOR IMMUNIZATION: ICD-10-CM

## 2023-11-21 DIAGNOSIS — F41.9 ANXIETY: Primary | ICD-10-CM

## 2023-11-21 DIAGNOSIS — N92.6 IRREGULAR MENSES: ICD-10-CM

## 2023-11-21 PROCEDURE — 90471 IMMUNIZATION ADMIN: CPT

## 2023-11-21 PROCEDURE — 99213 OFFICE O/P EST LOW 20 MIN: CPT | Performed by: FAMILY MEDICINE

## 2023-11-21 PROCEDURE — 90686 IIV4 VACC NO PRSV 0.5 ML IM: CPT

## 2023-11-21 RX ORDER — NORGESTIMATE AND ETHINYL ESTRADIOL 7DAYSX3 LO
1 KIT ORAL DAILY
Qty: 84 TABLET | Refills: 0 | Status: SHIPPED | OUTPATIENT
Start: 2023-11-21

## 2023-11-21 RX ORDER — BUSPIRONE HYDROCHLORIDE 5 MG/1
5 TABLET ORAL 2 TIMES DAILY
Qty: 180 TABLET | Refills: 0 | Status: SHIPPED | OUTPATIENT
Start: 2023-11-21

## 2023-11-21 NOTE — PROGRESS NOTES
Name: Tae Serrano      : 2000      MRN: [id number]  Encounter Provider: Valentin He DO  Encounter Date: 2023   Encounter department: [address] [place] [address]     1. Anxiety  -     busPIRone (BUSPAR) 5 mg tablet; Take 1 tablet (5 mg total) by mouth 2 (two) times a day  Doing well on buspar and citalopram  Rx refilled for her buspar today    2. Irregular menses  -     norgestimate-ethinyl estradiol (Tri-Lo-Sprintec) 0.18/0.215/0.25 MG-25 MCG per tablet; Take 1 tablet by mouth daily  Doing well on oral contraceptives. Due for pap. Will schedule. 3. Encounter for immunization  -     influenza vaccine, quadrivalent, 0.5 mL, preservative-free, for adult and pediatric patients 6 mos+ (AFLURIA, FLUARIX, FLULAVAL, FLUZONE)        Depression Screening and Follow-up Plan: Patient was screened for depression during today's encounter. They screened negative with a PHQ-2 score of 0. Subjective     HPI  Patient is a 26year old female who is being seen today for f/u on her anxiety. She is home for her  break from college. She feels that the combination of the citalopram and buspar are working well for her. She is sleeping well. Rarely needs the vistaril. Is due for pap. Declines today. Will schedule over her  break. KULWINDER-7 Flowsheet Screening    Flowsheet Row Most Recent Value   Over the last 2 weeks, how often have you been bothered by any of the following problems?     Feeling nervous, anxious, or on edge 0   Not being able to stop or control worrying 0   Worrying too much about different things 0   Trouble relaxing 0   Being so restless that it is hard to sit still 0   Becoming easily annoyed or irritable 0   Feeling afraid as if something awful might happen 0   KULWINDER-7 Total Score 0        She   Review of Systems    Past Medical History:   Diagnosis Date   • Allergic    • Anxiety     Anxieties gets significantly more noticeable in week before   • Asthma     Had asthma as a child, grew out of it around age 17     History reviewed. No pertinent surgical history. Family History   Problem Relation Age of Onset   • Hypothyroidism Mother    • Thyroid disease Mother    • Diabetes type I Brother    • Diabetes Brother      Social History     Socioeconomic History   • Marital status: Single     Spouse name: None   • Number of children: None   • Years of education: None   • Highest education level: None   Occupational History   • None   Tobacco Use   • Smoking status: Never   • Smokeless tobacco: Never   Vaping Use   • Vaping Use: Never used   Substance and Sexual Activity   • Alcohol use: Yes     Alcohol/week: 2.0 standard drinks of alcohol     Types: 2 Glasses of wine per week     Comment: occasional   • Drug use: Never   • Sexual activity: Never   Other Topics Concern   • None   Social History Narrative    Lives with parents    She is a student at [employer] and [address] [place] [address]. Graduates spring 2024 and hopes to go to grad school.       Social Determinants of Health     Financial Resource Strain: Not on file   Food Insecurity: Not on file   Transportation Needs: Not on file   Physical Activity: Not on file   Stress: Not on file   Social Connections: Not on file   Intimate Partner Violence: Not on file   Housing Stability: Not on file     Current Outpatient Medications on File Prior to Visit   Medication Sig   • cetirizine (ZyrTEC) 10 mg tablet Take 10 mg by mouth daily   • citalopram (CeleXA) 20 mg tablet Take 1 tablet (20 mg total) by mouth daily   • Fluticasone Propionate (FLONASE ALLERGY RELIEF NA) into each nostril daily   • hydrOXYzine pamoate (VISTARIL) 25 mg capsule Take 1 capsule (25 mg total) by mouth 3 (three) times a day as needed for anxiety   • [DISCONTINUED] busPIRone (BUSPAR) 5 mg tablet Take 1 tablet (5 mg total) by mouth 2 (two) times a day   • [DISCONTINUED] norgestimate-ethinyl estradiol (Tri-Lo-Sprintec) 0. 18/0.215/0.25 MG-25 MCG per tablet Take 1 tablet by mouth daily     Allergies   Allergen Reactions   • Pollen Extract Nasal Congestion and Sneezing     Immunization History   Administered Date(s) Administered   • COVID-19 PFIZER VACCINE 0.3 ML IM 03/21/2021, 04/18/2021, 12/03/2021   • Influenza, injectable, quadrivalent, preservative free 0.5 mL 03/15/2023, 11/21/2023   • Tdap 08/16/2022       Objective     /50 (BP Location: Left arm, Patient Position: Sitting, Cuff Size: Standard)   Pulse 69   Temp 98.1 °F (36.7 °C) (Tympanic)   Resp 18   Ht 5' 9.2" (1.758 m)   Wt 66.2 kg (146 lb)   LMP 11/03/2023   SpO2 100%   BMI 21.44 kg/m²     Physical Exam  Vitals and nursing note reviewed. Constitutional:       General: She is not in acute distress. Appearance: Normal appearance. She is not ill-appearing, toxic-appearing or diaphoretic. HENT:      Head: Normocephalic and atraumatic. Eyes:      Conjunctiva/sclera: Conjunctivae normal.   Cardiovascular:      Rate and Rhythm: Normal rate and regular rhythm. Heart sounds: No murmur heard. Pulmonary:      Effort: Pulmonary effort is normal.      Breath sounds: Normal breath sounds. Musculoskeletal:      Cervical back: Normal range of motion and neck supple. Right lower leg: No edema. Left lower leg: No edema. Lymphadenopathy:      Cervical: No cervical adenopathy. Neurological:      Mental Status: She is alert.    Psychiatric:         Mood and Affect: Mood normal.   Rebecca Mock, DO

## 2023-12-22 ENCOUNTER — OFFICE VISIT (OUTPATIENT)
Dept: FAMILY MEDICINE CLINIC | Facility: CLINIC | Age: 23
End: 2023-12-22
Payer: COMMERCIAL

## 2023-12-22 VITALS
BODY MASS INDEX: 21.05 KG/M2 | HEART RATE: 97 BPM | HEIGHT: 70 IN | WEIGHT: 147 LBS | OXYGEN SATURATION: 100 % | DIASTOLIC BLOOD PRESSURE: 50 MMHG | TEMPERATURE: 98.1 F | RESPIRATION RATE: 18 BRPM | SYSTOLIC BLOOD PRESSURE: 110 MMHG

## 2023-12-22 DIAGNOSIS — N92.6 IRREGULAR MENSES: ICD-10-CM

## 2023-12-22 DIAGNOSIS — Z01.419 ENCOUNTER FOR GYNECOLOGICAL EXAMINATION: Primary | ICD-10-CM

## 2023-12-22 DIAGNOSIS — Z12.4 CERVICAL CANCER SCREENING: ICD-10-CM

## 2023-12-22 PROCEDURE — 99213 OFFICE O/P EST LOW 20 MIN: CPT | Performed by: FAMILY MEDICINE

## 2023-12-22 NOTE — PROGRESS NOTES
Name: Michaelle Downs      : 2000      MRN: [id number]  Encounter Provider: Daiana Gómez DO  Encounter Date: 2023   Encounter department: [facility] PRIMARY CARE    Assessment & Plan     1. Encounter for gynecological examination    2. Cervical cancer screening  -     IGP, Aptima HPV, Rfx 16/18,45    3. Irregular menses  Thin prep pap done   She is taking OC's for her irregular menses and doing well  Declines chlamydia screen as she is not sexually active  Had HPV vaccines    Depression Screening and Follow-up Plan: Patient was screened for depression during today's encounter. They screened negative with a PHQ-2 score of 0.      Subjective     HPI  Patient is a 23 year old female with anxiety who is being seen today for her pap and pelvic exam.     She is not sexually active.     Has received HPV vaccines.     On oral contraceptives for her irregular menses and this is working well for her.     FDP 12/15/23        Review of Systems    Past Medical History:   Diagnosis Date    Allergic     Anxiety     Anxieties gets significantly more noticeable in week before    Asthma     Had asthma as a child, grew out of it around age 14     History reviewed. No pertinent surgical history.  Family History   Problem Relation Age of Onset    Hypothyroidism Mother     Thyroid disease Mother     Diabetes type I Brother     Diabetes Brother      Social History     Socioeconomic History    Marital status: Single     Spouse name: None    Number of children: None    Years of education: None    Highest education level: None   Occupational History    None   Tobacco Use    Smoking status: Never    Smokeless tobacco: Never   Vaping Use    Vaping status: Never Used   Substance and Sexual Activity    Alcohol use: Yes     Alcohol/week: 2.0 standard drinks of alcohol     Types: 2 Glasses of wine per week     Comment: occasional    Drug use: Never    Sexual activity: Never   Other Topics Concern    None  "  Social History Narrative    Lives with parents    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 Graduates spring 2024 and hopes to go to grad school.      Social Determinants of Health     Financial Resource Strain: Not on file   Food Insecurity: Not on file   Transportation Needs: Not on file   Physical Activity: Not on file   Stress: Not on file   Social Connections: Not on file   Intimate Partner Violence: Not on file   Housing Stability: Not on file     Current Outpatient Medications on File Prior to Visit   Medication Sig    busPIRone (BUSPAR) 5 mg tablet Take 1 tablet (5 mg total) by mouth 2 (two) times a day    cetirizine (ZyrTEC) 10 mg tablet Take 10 mg by mouth daily    citalopram (CeleXA) 20 mg tablet Take 1 tablet (20 mg total) by mouth daily    Fluticasone Propionate (FLONASE ALLERGY RELIEF NA) into each nostril daily    hydrOXYzine pamoate (VISTARIL) 25 mg capsule Take 1 capsule (25 mg total) by mouth 3 (three) times a day as needed for anxiety    norgestimate-ethinyl estradiol (Tri-Lo-Sprintec) 0.18/0.215/0.25 MG-25 MCG per tablet Take 1 tablet by mouth daily     Allergies   Allergen Reactions    Pollen Extract Nasal Congestion and Sneezing     Immunization History   Administered Date(s) Administered    COVID-19 PFIZER VACCINE 0.3 ML IM 03/21/2021, 04/18/2021, 12/03/2021    HPV9 07/26/2017, 09/28/2017, 01/29/2018    Influenza, injectable, quadrivalent, preservative free 0.5 mL 03/15/2023, 11/21/2023    Meningococcal B, Recombinant (TRUMENBA) 07/27/2018    Meningococcal MCV4, Unspecified 07/27/2018    Tdap 08/16/2022       Objective     /50 (BP Location: Left arm, Patient Position: Sitting, Cuff Size: Standard)   Pulse 97   Temp 98.1 °F (36.7 °C) (Tympanic)   Resp 18   Ht 5' 10\" (1.778 m)   Wt 66.7 kg (147 lb)   LMP 12/07/2023   SpO2 100%   BMI 21.09 kg/m²     Physical Exam  Vitals and nursing note reviewed. Exam conducted with a chaperone present.   Constitutional:       " General: She is not in acute distress.     Appearance: Normal appearance. She is not ill-appearing, toxic-appearing or diaphoretic.   Cardiovascular:      Rate and Rhythm: Normal rate and regular rhythm.      Pulses: Normal pulses.      Heart sounds: No murmur heard.  Pulmonary:      Effort: Pulmonary effort is normal.      Breath sounds: Normal breath sounds.   Chest:   Breasts:     Right: Normal.      Left: Normal.   Abdominal:      Tenderness: There is no abdominal tenderness.   Genitourinary:     Pubic Area: No rash.       Labia:         Right: No rash.         Left: No rash.       Vagina: Normal. No vaginal discharge.      Cervix: Normal. No cervical motion tenderness, friability or erythema.      Uterus: Normal.       Adnexa: Right adnexa normal and left adnexa normal.   Musculoskeletal:      Cervical back: Normal range of motion and neck supple.      Right lower leg: No edema.      Left lower leg: No edema.   Lymphadenopathy:      Cervical: No cervical adenopathy.   Skin:     Findings: No rash.   Neurological:      General: No focal deficit present.      Mental Status: She is alert and oriented to person, place, and time.   Psychiatric:         Mood and Affect: Mood normal.   Daiana Gómez DO

## 2023-12-27 LAB
CYTOLOGIST CVX/VAG CYTO: NORMAL
DX ICD CODE: NORMAL
HPV GENOTYPE REFLEX: NORMAL
HPV I/H RISK 4 DNA CVX QL PROBE+SIG AMP: NEGATIVE
OTHER STN SPEC: NORMAL
PATH REPORT.FINAL DX SPEC: NORMAL
SL AMB NOTE:: NORMAL
SL AMB SPECIMEN ADEQUACY: NORMAL
SL AMB TEST METHODOLOGY: NORMAL

## 2024-01-07 DIAGNOSIS — F41.9 ANXIETY: ICD-10-CM

## 2024-01-08 RX ORDER — CITALOPRAM 20 MG/1
20 TABLET ORAL DAILY
Qty: 90 TABLET | Refills: 0 | Status: SHIPPED | OUTPATIENT
Start: 2024-01-08

## 2024-01-24 DIAGNOSIS — F41.9 ANXIETY: ICD-10-CM

## 2024-01-24 RX ORDER — CITALOPRAM 20 MG/1
20 TABLET ORAL DAILY
Qty: 90 TABLET | Refills: 0 | Status: SHIPPED | OUTPATIENT
Start: 2024-01-24

## 2024-02-05 DIAGNOSIS — N92.6 IRREGULAR MENSES: ICD-10-CM

## 2024-02-05 RX ORDER — NORGESTIMATE AND ETHINYL ESTRADIOL
1 KIT DAILY
Qty: 84 TABLET | Refills: 0 | Status: SHIPPED | OUTPATIENT
Start: 2024-02-05

## 2024-02-07 DIAGNOSIS — F41.9 ANXIETY: ICD-10-CM

## 2024-02-07 RX ORDER — BUSPIRONE HYDROCHLORIDE 5 MG/1
5 TABLET ORAL 2 TIMES DAILY
Qty: 180 TABLET | Refills: 0 | Status: SHIPPED | OUTPATIENT
Start: 2024-02-07

## 2024-02-21 DIAGNOSIS — F41.9 ANXIETY: ICD-10-CM

## 2024-02-21 DIAGNOSIS — N92.6 IRREGULAR MENSES: ICD-10-CM

## 2024-02-22 RX ORDER — CITALOPRAM 20 MG/1
20 TABLET ORAL DAILY
Qty: 90 TABLET | Refills: 0 | Status: SHIPPED | OUTPATIENT
Start: 2024-02-22

## 2024-02-22 RX ORDER — NORGESTIMATE AND ETHINYL ESTRADIOL 7DAYSX3 LO
1 KIT ORAL DAILY
Qty: 84 TABLET | Refills: 0 | Status: SHIPPED | OUTPATIENT
Start: 2024-02-22

## 2024-04-05 DIAGNOSIS — F41.9 ANXIETY: ICD-10-CM

## 2024-04-05 DIAGNOSIS — N92.6 IRREGULAR MENSES: ICD-10-CM

## 2024-04-09 RX ORDER — BUSPIRONE HYDROCHLORIDE 5 MG/1
5 TABLET ORAL 2 TIMES DAILY
Qty: 180 TABLET | Refills: 0 | Status: SHIPPED | OUTPATIENT
Start: 2024-04-09

## 2024-04-09 RX ORDER — CITALOPRAM 20 MG/1
20 TABLET ORAL DAILY
Qty: 90 TABLET | Refills: 0 | Status: SHIPPED | OUTPATIENT
Start: 2024-04-09

## 2024-04-09 RX ORDER — NORGESTIMATE AND ETHINYL ESTRADIOL 7DAYSX3 LO
1 KIT ORAL DAILY
Qty: 84 TABLET | Refills: 0 | Status: SHIPPED | OUTPATIENT
Start: 2024-04-09

## 2024-04-28 DIAGNOSIS — F41.9 ANXIETY: ICD-10-CM

## 2024-04-29 RX ORDER — CITALOPRAM 20 MG/1
20 TABLET ORAL DAILY
Qty: 90 TABLET | Refills: 0 | OUTPATIENT
Start: 2024-04-29

## 2024-06-07 NOTE — PROGRESS NOTES
Adult Annual Physical  Name: Michaelle Downs      : 2000      MRN: [id number]  Encounter Provider: Daiana Gómez DO  Encounter Date: 6/10/2024   Encounter department: [facility] PRIMARY CARE    Assessment & Plan   1. Annual physical exam  2. Anxiety  -     citalopram (CeleXA) 20 mg tablet; Take 1 tablet (20 mg total) by mouth daily  -     busPIRone (BUSPAR) 5 mg tablet; Take 1 tablet (5 mg total) by mouth 2 (two) times a day  3. Irregular menses  -     norgestimate-ethinyl estradiol (Tri-Lo-Sprintec) 0.18/0.215/0.25 MG-25 MCG per tablet; Take 1 tablet by mouth daily  Immunizations and preventive care screenings were discussed with patient today. Appropriate education was printed on patient's after visit summary.    Counseling:  Alcohol/drug use: discussed moderation in alcohol intake, the recommendations for healthy alcohol use, and avoidance of illicit drug use.  Dental Health: discussed importance of regular tooth brushing, flossing, and dental visits.  Injury prevention: discussed safety/seat belts, safety helmets, smoke detectors, carbon dioxide detectors, and smoking near bedding or upholstery.  Sexual health: discussed sexually transmitted diseases, partner selection, use of condoms, avoidance of unintended pregnancy, and contraceptive alternatives.  Exercise: the importance of regular exercise/physical activity was discussed. Recommend exercise 3-5 times per week for at least 30 minutes.          History of Present Illness     Adult Annual Physical:  Patient presents for annual physical.     Diet and Physical Activity:  - Diet/Nutrition: well balanced diet.  - Exercise: walking, 5-7 times a week on average and less than 30 minutes on average.    Depression Screening:  - PHQ-2 Score: 0    General Health:  - Sleep: sleeps well.  - Hearing: normal hearing right ear and normal hearing left ear.  - Vision: no vision problems.  - Dental: regular dental visits.    /GYN Health:  - Follows  "with GYN: no.   - Menopause: premenopausal.   - Last menstrual cycle: 6/8/2024.   - History of STDs: no  - Contraception: oral contraceptives. not sexuallya ctive. on oc's for menstrual irregularities      Advanced Care Planning:  - Has an advanced directive?: no    - Has a durable medical POA?: no    - ACP document given to patient?: simone Lopez is a 24 year old female who is being seen today for annual physical and f/u on her anxiety.   Cervical cancer screening is up to date (12/22/23).   Taking citalopram 20 mg along with buspar 5 mg bid for her anxiety  This combination is working well for her.   Needs refill on both. Also needs rx refill on her OC\"s.     Review of Systems  Medical History Reviewed by provider this encounter:  Tobacco  Allergies  Meds  Problems  Med Hx  Surg Hx  Fam Hx  Soc   Hx      Current Outpatient Medications on File Prior to Visit   Medication Sig Dispense Refill   • cetirizine (ZyrTEC) 10 mg tablet Take 10 mg by mouth daily     • Fluticasone Propionate (FLONASE ALLERGY RELIEF NA) into each nostril daily     • hydrOXYzine pamoate (VISTARIL) 25 mg capsule Take 1 capsule (25 mg total) by mouth 3 (three) times a day as needed for anxiety 30 capsule 0   • [DISCONTINUED] busPIRone (BUSPAR) 5 mg tablet Take 1 tablet (5 mg total) by mouth 2 (two) times a day 180 tablet 0   • [DISCONTINUED] citalopram (CeleXA) 20 mg tablet Take 1 tablet (20 mg total) by mouth daily 90 tablet 0   • [DISCONTINUED] norgestimate-ethinyl estradiol (Tri-Lo-Sprintec) 0.18/0.215/0.25 MG-25 MCG per tablet Take 1 tablet by mouth daily 84 tablet 0     No current facility-administered medications on file prior to visit.        Objective     BP 94/64 (BP Location: Left arm, Patient Position: Sitting, Cuff Size: Standard)   Pulse 94   Temp (!) 97.4 °F (36.3 °C) (Tympanic)   Resp 18   Ht 5' 9.69\" (1.77 m)   Wt 68 kg (150 lb)   LMP 06/08/2024   SpO2 100%   BMI 21.72 kg/m²     Physical Exam  Vitals and nursing " note reviewed.   Constitutional:       General: She is not in acute distress.     Appearance: Normal appearance. She is not ill-appearing, toxic-appearing or diaphoretic.   HENT:      Head: Normocephalic and atraumatic.      Right Ear: Tympanic membrane normal.      Left Ear: Tympanic membrane normal.      Nose: Nose normal.      Mouth/Throat:      Mouth: Mucous membranes are moist.      Pharynx: No posterior oropharyngeal erythema.   Eyes:      Extraocular Movements: Extraocular movements intact.      Conjunctiva/sclera: Conjunctivae normal.      Pupils: Pupils are equal, round, and reactive to light.   Cardiovascular:      Rate and Rhythm: Normal rate and regular rhythm.      Heart sounds: No murmur heard.  Pulmonary:      Effort: Pulmonary effort is normal.      Breath sounds: Normal breath sounds.   Abdominal:      General: Abdomen is flat. Bowel sounds are normal.      Palpations: Abdomen is soft.      Tenderness: There is no abdominal tenderness.   Musculoskeletal:      Cervical back: Normal range of motion and neck supple.      Right lower leg: No edema.      Left lower leg: No edema.   Lymphadenopathy:      Cervical: No cervical adenopathy.   Skin:     Comments: Verruca left knee   Neurological:      General: No focal deficit present.      Mental Status: She is alert and oriented to person, place, and time.      Deep Tendon Reflexes: Reflexes normal.   Psychiatric:         Mood and Affect: Mood normal.     Administrative Statements

## 2024-06-10 ENCOUNTER — OFFICE VISIT (OUTPATIENT)
Dept: FAMILY MEDICINE CLINIC | Facility: CLINIC | Age: 24
End: 2024-06-10
Payer: COMMERCIAL

## 2024-06-10 VITALS
SYSTOLIC BLOOD PRESSURE: 94 MMHG | TEMPERATURE: 97.4 F | DIASTOLIC BLOOD PRESSURE: 64 MMHG | HEART RATE: 94 BPM | BODY MASS INDEX: 21.47 KG/M2 | WEIGHT: 150 LBS | OXYGEN SATURATION: 100 % | RESPIRATION RATE: 18 BRPM | HEIGHT: 70 IN

## 2024-06-10 DIAGNOSIS — Z00.00 ANNUAL PHYSICAL EXAM: Primary | ICD-10-CM

## 2024-06-10 DIAGNOSIS — F41.9 ANXIETY: ICD-10-CM

## 2024-06-10 DIAGNOSIS — N92.6 IRREGULAR MENSES: ICD-10-CM

## 2024-06-10 PROCEDURE — 99213 OFFICE O/P EST LOW 20 MIN: CPT | Performed by: FAMILY MEDICINE

## 2024-06-10 PROCEDURE — 99395 PREV VISIT EST AGE 18-39: CPT | Performed by: FAMILY MEDICINE

## 2024-06-10 RX ORDER — NORGESTIMATE AND ETHINYL ESTRADIOL 7DAYSX3 LO
1 KIT ORAL DAILY
Qty: 84 TABLET | Refills: 1 | Status: SHIPPED | OUTPATIENT
Start: 2024-06-10

## 2024-06-10 RX ORDER — CITALOPRAM 20 MG/1
20 TABLET ORAL DAILY
Qty: 90 TABLET | Refills: 1 | Status: SHIPPED | OUTPATIENT
Start: 2024-06-10

## 2024-06-10 RX ORDER — BUSPIRONE HYDROCHLORIDE 5 MG/1
5 TABLET ORAL 2 TIMES DAILY
Qty: 180 TABLET | Refills: 0 | Status: SHIPPED | OUTPATIENT
Start: 2024-06-10

## 2024-08-20 DIAGNOSIS — N92.6 IRREGULAR MENSES: ICD-10-CM

## 2024-08-20 DIAGNOSIS — F41.9 ANXIETY: ICD-10-CM

## 2024-08-20 RX ORDER — CITALOPRAM HYDROBROMIDE 20 MG/1
20 TABLET ORAL DAILY
Qty: 90 TABLET | Refills: 1 | Status: SHIPPED | OUTPATIENT
Start: 2024-08-20

## 2024-08-20 RX ORDER — NORGESTIMATE AND ETHINYL ESTRADIOL 7DAYSX3 LO
1 KIT ORAL DAILY
Qty: 84 TABLET | Refills: 0 | Status: SHIPPED | OUTPATIENT
Start: 2024-08-20

## 2024-09-10 DIAGNOSIS — F41.9 ANXIETY: ICD-10-CM

## 2024-09-10 RX ORDER — CITALOPRAM HYDROBROMIDE 20 MG/1
20 TABLET ORAL DAILY
Qty: 90 TABLET | Refills: 1 | Status: SHIPPED | OUTPATIENT
Start: 2024-09-10

## 2024-09-10 RX ORDER — BUSPIRONE HYDROCHLORIDE 5 MG/1
5 TABLET ORAL 2 TIMES DAILY
Qty: 180 TABLET | Refills: 1 | Status: SHIPPED | OUTPATIENT
Start: 2024-09-10

## 2024-11-15 DIAGNOSIS — N92.6 IRREGULAR MENSES: ICD-10-CM

## 2024-11-15 RX ORDER — NORGESTIMATE AND ETHINYL ESTRADIOL 7DAYSX3 LO
1 KIT ORAL DAILY
Qty: 84 TABLET | Refills: 1 | Status: SHIPPED | OUTPATIENT
Start: 2024-11-15

## 2024-12-24 NOTE — ASSESSMENT & PLAN NOTE
Doing well on current combination of citalopram 20 mg and buspar 5 mg bid.   Continue same.   Orders:  •  citalopram (CeleXA) 20 mg tablet; Take 1 tablet (20 mg total) by mouth daily

## 2024-12-24 NOTE — PROGRESS NOTES
Name: Michaelle Downs      : 2000      MRN: [id number]  Encounter Provider: Daiana Gómez DO  Encounter Date: 2024   Encounter department: [facility] PRIMARY CARE    Assessment & Plan  Anxiety  Doing well on current combination of citalopram 20 mg and buspar 5 mg bid.   Continue same.   Orders:  •  citalopram (CeleXA) 20 mg tablet; Take 1 tablet (20 mg total) by mouth daily    Encounter for immunization    Orders:  •  influenza vaccine preservative-free 0.5 mL IM (Fluzone, Afluria, Fluarix, Flulaval)    Irregular menses    Orders:  •  Norgestimate-Eth Estradiol (Tri-Lo-Tanja) 0.18/0.215/0.25 MG-25 MCG TABS; Take 1 tablet by mouth daily         History of Present Illness     HPI  Patient is a 24 year old female who is being seen today for f/u on her anxiety.   She is currently being prescribed buspar 5 mg bid along with citalopram 20 mg once daily    She states that her anxiety has been well controlled on this combination.   She sleeps well.   Has some fatigue mid day. Nothing consistent.   She needs refills on oral contraceptive that she is taking for her irregular menses and for the citalopram.     Applying to [facility]     KULWINDER-7 Flowsheet Screening    Flowsheet Row Most Recent Value   Over the last two weeks, how often have you been bothered by the following problems?     Feeling nervous, anxious, or on edge 0   Not being able to stop or control worrying 0   Worrying too much about different things 0   Trouble relaxing  0   Being so restless that it's hard to sit still 0   Becoming easily annoyed or irritable  0   Feeling afraid as if something awful might happen 0   How difficult have these problems made it for you to do your work, take care of things at home, or get along with other people?  Not difficult at all   KULWINDER Score  0              Review of Systems  Past Medical History:   Diagnosis Date   • Allergic    • Anxiety     Anxieties gets significantly more noticeable in week  before   • Asthma     Had asthma as a child, grew out of it around age 14     Past Surgical History:   Procedure Laterality Date   • WISDOM TOOTH EXTRACTION  05/28/2024     Family History   Problem Relation Age of Onset   • Hypothyroidism Mother    • Thyroid disease Mother    • Diabetes type I Brother    • Diabetes Brother      Social History     Tobacco Use   • Smoking status: Never   • Smokeless tobacco: Never   Vaping Use   • Vaping status: Never Used   Substance and Sexual Activity   • Alcohol use: Yes     Alcohol/week: 2.0 standard drinks of alcohol     Types: 2 Glasses of wine per week     Comment: occasional   • Drug use: Never   • Sexual activity: Never     Current Outpatient Medications on File Prior to Visit   Medication Sig   • busPIRone (BUSPAR) 5 mg tablet Take 1 tablet (5 mg total) by mouth 2 (two) times a day   • cetirizine (ZyrTEC) 10 mg tablet Take 10 mg by mouth daily   • Fluticasone Propionate (FLONASE ALLERGY RELIEF NA) into each nostril daily   • hydrOXYzine pamoate (VISTARIL) 25 mg capsule Take 1 capsule (25 mg total) by mouth 3 (three) times a day as needed for anxiety   • [DISCONTINUED] citalopram (CeleXA) 20 mg tablet Take 1 tablet (20 mg total) by mouth daily   • [DISCONTINUED] norgestimate-ethinyl estradiol (Tri-Lo-Tanja) 0.18/0.215/0.25 MG-25 MCG per tablet TAKE 1 TABLET BY MOUTH DAILY     Allergies   Allergen Reactions   • Pollen Extract Nasal Congestion and Sneezing     Immunization History   Administered Date(s) Administered   • COVID-19 PFIZER VACCINE 0.3 ML IM 03/21/2021, 04/18/2021, 12/03/2021, 01/03/2022   • HPV9 07/26/2017, 09/28/2017, 01/29/2018   • Influenza, injectable, quadrivalent, preservative free 0.5 mL 03/15/2023, 11/21/2023   • Influenza, seasonal, injectable, preservative free 12/30/2024   • Meningococcal B, Recombinant (TRUMENBA) 07/27/2018   • Meningococcal MCV4, Unspecified 07/27/2018   • Tdap 08/16/2022     Objective   /63   Pulse 101   Wt 68.9 kg (151 lb  12.8 oz)   SpO2 98%   BMI 21.98 kg/m²     Physical Exam  Vitals and nursing note reviewed.   Constitutional:       General: She is not in acute distress.     Appearance: Normal appearance. She is not ill-appearing, toxic-appearing or diaphoretic.   Cardiovascular:      Rate and Rhythm: Normal rate and regular rhythm.      Heart sounds: No murmur heard.  Pulmonary:      Effort: Pulmonary effort is normal.      Breath sounds: Normal breath sounds.   Musculoskeletal:      Cervical back: Normal range of motion and neck supple.   Lymphadenopathy:      Cervical: No cervical adenopathy.   Neurological:      General: No focal deficit present.      Mental Status: She is alert.   Psychiatric:         Mood and Affect: Mood normal.

## 2024-12-30 ENCOUNTER — OFFICE VISIT (OUTPATIENT)
Dept: FAMILY MEDICINE CLINIC | Facility: CLINIC | Age: 24
End: 2024-12-30
Payer: COMMERCIAL

## 2024-12-30 VITALS
BODY MASS INDEX: 21.98 KG/M2 | SYSTOLIC BLOOD PRESSURE: 112 MMHG | OXYGEN SATURATION: 98 % | WEIGHT: 151.8 LBS | DIASTOLIC BLOOD PRESSURE: 63 MMHG | HEART RATE: 101 BPM

## 2024-12-30 DIAGNOSIS — F41.9 ANXIETY: Primary | ICD-10-CM

## 2024-12-30 DIAGNOSIS — N92.6 IRREGULAR MENSES: ICD-10-CM

## 2024-12-30 DIAGNOSIS — Z23 ENCOUNTER FOR IMMUNIZATION: ICD-10-CM

## 2024-12-30 PROCEDURE — 90471 IMMUNIZATION ADMIN: CPT

## 2024-12-30 PROCEDURE — 90656 IIV3 VACC NO PRSV 0.5 ML IM: CPT

## 2024-12-30 PROCEDURE — 99213 OFFICE O/P EST LOW 20 MIN: CPT | Performed by: FAMILY MEDICINE

## 2024-12-30 RX ORDER — CITALOPRAM HYDROBROMIDE 20 MG/1
20 TABLET ORAL DAILY
Qty: 90 TABLET | Refills: 1 | Status: SHIPPED | OUTPATIENT
Start: 2024-12-30

## 2024-12-30 RX ORDER — NORGESTIMATE AND ETHINYL ESTRADIOL 7DAYSX3 LO
1 KIT ORAL DAILY
Qty: 84 TABLET | Refills: 1 | Status: SHIPPED | OUTPATIENT
Start: 2024-12-30

## 2024-12-30 NOTE — ASSESSMENT & PLAN NOTE
Orders:  •  Norgestimate-Eth Estradiol (Tri-Lo-Tanja) 0.18/0.215/0.25 MG-25 MCG TABS; Take 1 tablet by mouth daily

## 2025-02-05 DIAGNOSIS — F41.9 ANXIETY: ICD-10-CM

## 2025-02-06 RX ORDER — CITALOPRAM HYDROBROMIDE 20 MG/1
20 TABLET ORAL DAILY
Qty: 90 TABLET | Refills: 0 | Status: SHIPPED | OUTPATIENT
Start: 2025-02-06

## 2025-02-11 DIAGNOSIS — N92.6 IRREGULAR MENSES: ICD-10-CM

## 2025-02-11 RX ORDER — NORGESTIMATE AND ETHINYL ESTRADIOL 7DAYSX3 LO
1 KIT ORAL DAILY
Qty: 84 TABLET | Refills: 0 | Status: SHIPPED | OUTPATIENT
Start: 2025-02-11

## 2025-03-12 DIAGNOSIS — F41.9 ANXIETY: ICD-10-CM

## 2025-03-12 DIAGNOSIS — J30.9 ALLERGIC RHINITIS, UNSPECIFIED SEASONALITY, UNSPECIFIED TRIGGER: Primary | ICD-10-CM

## 2025-03-13 RX ORDER — CITALOPRAM HYDROBROMIDE 20 MG/1
20 TABLET ORAL DAILY
Qty: 90 TABLET | Refills: 1 | Status: SHIPPED | OUTPATIENT
Start: 2025-03-13

## 2025-03-13 RX ORDER — CETIRIZINE HYDROCHLORIDE 10 MG/1
10 TABLET ORAL DAILY
Qty: 100 TABLET | Refills: 0 | Status: SHIPPED | OUTPATIENT
Start: 2025-03-13

## 2025-04-14 DIAGNOSIS — F41.9 ANXIETY: ICD-10-CM

## 2025-04-15 RX ORDER — BUSPIRONE HYDROCHLORIDE 5 MG/1
5 TABLET ORAL 2 TIMES DAILY
Qty: 180 TABLET | Refills: 1 | Status: SHIPPED | OUTPATIENT
Start: 2025-04-15

## 2025-05-10 DIAGNOSIS — N92.6 IRREGULAR MENSES: ICD-10-CM

## 2025-05-11 RX ORDER — NORGESTIMATE AND ETHINYL ESTRADIOL 7DAYSX3 LO
1 KIT ORAL DAILY
Qty: 84 TABLET | Refills: 1 | Status: SHIPPED | OUTPATIENT
Start: 2025-05-11

## 2025-08-06 DIAGNOSIS — N92.6 IRREGULAR MENSES: ICD-10-CM

## 2025-08-08 RX ORDER — NORGESTIMATE AND ETHINYL ESTRADIOL 7DAYSX3 LO
1 KIT ORAL DAILY
Qty: 28 TABLET | Refills: 5 | Status: SHIPPED | OUTPATIENT
Start: 2025-08-08

## 2025-08-18 DIAGNOSIS — F41.9 ANXIETY: ICD-10-CM

## 2025-08-20 RX ORDER — CITALOPRAM HYDROBROMIDE 20 MG/1
20 TABLET ORAL DAILY
Qty: 90 TABLET | Refills: 0 | Status: SHIPPED | OUTPATIENT
Start: 2025-08-20